# Patient Record
Sex: MALE | Race: ASIAN | URBAN - METROPOLITAN AREA
[De-identification: names, ages, dates, MRNs, and addresses within clinical notes are randomized per-mention and may not be internally consistent; named-entity substitution may affect disease eponyms.]

---

## 2024-06-24 ENCOUNTER — OFFICE (OUTPATIENT)
Dept: URBAN - METROPOLITAN AREA CLINIC 76 | Facility: CLINIC | Age: 44
End: 2024-06-24

## 2024-06-24 VITALS
DIASTOLIC BLOOD PRESSURE: 74 MMHG | HEART RATE: 65 BPM | HEIGHT: 67 IN | SYSTOLIC BLOOD PRESSURE: 120 MMHG | WEIGHT: 172 LBS

## 2024-06-24 DIAGNOSIS — K62.5 HEMORRHAGE OF ANUS AND RECTUM: ICD-10-CM

## 2024-06-24 DIAGNOSIS — R10.11 RIGHT UPPER QUADRANT PAIN: ICD-10-CM

## 2024-06-24 DIAGNOSIS — K76.0 FATTY (CHANGE OF) LIVER, NOT ELSEWHERE CLASSIFIED: ICD-10-CM

## 2024-06-24 DIAGNOSIS — K59.00 CONSTIPATION, UNSPECIFIED: ICD-10-CM

## 2024-06-24 DIAGNOSIS — L29.0 PRURITUS ANI: ICD-10-CM

## 2024-06-24 DIAGNOSIS — K64.8 OTHER HEMORRHOIDS: ICD-10-CM

## 2024-06-24 PROCEDURE — 99204 OFFICE O/P NEW MOD 45 MIN: CPT | Performed by: INTERNAL MEDICINE

## 2024-06-24 RX ORDER — PANTOPRAZOLE SODIUM 40 MG/1
TABLET, DELAYED RELEASE ORAL
Qty: 30 | Refills: 6 | Status: ACTIVE
Start: 2024-06-24

## 2025-02-25 ENCOUNTER — OFFICE VISIT (OUTPATIENT)
Dept: FAMILY MEDICINE CLINIC | Facility: CLINIC | Age: 45
End: 2025-02-25
Payer: COMMERCIAL

## 2025-02-25 VITALS
OXYGEN SATURATION: 98 % | HEIGHT: 67 IN | WEIGHT: 177.5 LBS | RESPIRATION RATE: 16 BRPM | TEMPERATURE: 98.5 F | BODY MASS INDEX: 27.86 KG/M2 | DIASTOLIC BLOOD PRESSURE: 94 MMHG | SYSTOLIC BLOOD PRESSURE: 150 MMHG | HEART RATE: 82 BPM

## 2025-02-25 DIAGNOSIS — K76.0 FATTY LIVER: ICD-10-CM

## 2025-02-25 DIAGNOSIS — Z00.00 ANNUAL PHYSICAL EXAM: ICD-10-CM

## 2025-02-25 DIAGNOSIS — Z00.00 ENCOUNTER FOR MEDICAL EXAMINATION TO ESTABLISH CARE: Primary | ICD-10-CM

## 2025-02-25 DIAGNOSIS — K92.1: ICD-10-CM

## 2025-02-25 DIAGNOSIS — R79.89 ELEVATED LFTS: ICD-10-CM

## 2025-02-25 DIAGNOSIS — K21.9 GASTROESOPHAGEAL REFLUX DISEASE, UNSPECIFIED WHETHER ESOPHAGITIS PRESENT: ICD-10-CM

## 2025-02-25 DIAGNOSIS — K64.4 EXTERNAL HEMORRHOIDS: ICD-10-CM

## 2025-02-25 PROCEDURE — 99204 OFFICE O/P NEW MOD 45 MIN: CPT | Performed by: STUDENT IN AN ORGANIZED HEALTH CARE EDUCATION/TRAINING PROGRAM

## 2025-02-26 LAB
ALBUMIN SERPL-MCNC: 4.8 G/DL (ref 4.1–5.1)
ALP SERPL-CCNC: 87 IU/L (ref 44–121)
ALT SERPL-CCNC: 25 IU/L (ref 0–44)
AST SERPL-CCNC: 21 IU/L (ref 0–40)
BILIRUB SERPL-MCNC: 0.7 MG/DL (ref 0–1.2)
BUN SERPL-MCNC: 14 MG/DL (ref 6–24)
BUN/CREAT SERPL: 17 (ref 9–20)
CALCIUM SERPL-MCNC: 9.9 MG/DL (ref 8.7–10.2)
CHLORIDE SERPL-SCNC: 101 MMOL/L (ref 96–106)
CO2 SERPL-SCNC: 23 MMOL/L (ref 20–29)
CREAT SERPL-MCNC: 0.84 MG/DL (ref 0.76–1.27)
EGFRCR SERPLBLD CKD-EPI 2021: 110 ML/MIN/1.73
ERYTHROCYTE [DISTWIDTH] IN BLOOD BY AUTOMATED COUNT: 12.2 % (ref 11.6–15.4)
GLOBULIN SER CALC-MCNC: 3.2 G/DL (ref 1.5–4.5)
GLUCOSE SERPL-MCNC: 97 MG/DL (ref 70–99)
HCT VFR BLD AUTO: 50.1 % (ref 37.5–51)
HGB BLD-MCNC: 16.5 G/DL (ref 13–17.7)
MCH RBC QN AUTO: 29.7 PG (ref 26.6–33)
MCHC RBC AUTO-ENTMCNC: 32.9 G/DL (ref 31.5–35.7)
MCV RBC AUTO: 90 FL (ref 79–97)
PLATELET # BLD AUTO: 279 X10E3/UL (ref 150–450)
POTASSIUM SERPL-SCNC: 4.1 MMOL/L (ref 3.5–5.2)
PROT SERPL-MCNC: 8 G/DL (ref 6–8.5)
RBC # BLD AUTO: 5.55 X10E6/UL (ref 4.14–5.8)
SODIUM SERPL-SCNC: 139 MMOL/L (ref 134–144)
WBC # BLD AUTO: 7.2 X10E3/UL (ref 3.4–10.8)

## 2025-02-26 NOTE — PROGRESS NOTES
Chief Complaint  Establish Care    Subjective    History of Present Illness  The patient is a 45-year-old gentleman here for the first time to establish care as a new patient.    He has been diagnosed with fatty liver disease, identified through an abdominal ultrasound conducted during a routine checkup. His last consultation was with Dr. Mendez, a gastroenterologist, six months ago, during which his blood pressure was within normal limits. He has elevated liver function tests.    In 2013, he underwent a colonoscopy that revealed the presence of external hemorrhoids. He has recently noticed blood in his stool, which he attributes to his long-standing hemorrhoids. He does not report any constipation or urinary issues. He has been referred for a colonoscopy by another physician but has not yet undergone the procedure due to financial constraints.    He also reports occasional episodes of acid reflux but has not had an endoscopy for this issue.    He is a former smoker and currently abstains from smoking. He does not consume alcohol daily but admits to drinking on weekends.    He is experiencing sleep disturbances, which he believes are due to work-related stress. His work schedule requires him to communicate with his team in [facility] during the night, resulting in a sleep pattern that typically begins after midnight and ends around 7:00 AM. Despite this, he often does not fall asleep until approximately 2:00 AM, leading to a total sleep duration of about 5 hours. This sleep pattern has been consistent for an extended period.    SOCIAL HISTORY  The patient is a former smoker but currently does not smoke. He does not drink alcohol regularly, only on weekends.    FAMILY HISTORY  The patient has no family history of colon cancer.    ALLERGIES  The patient has no known allergies.       Zita Flynn Nato presents to [facility]  History of Present Illness    Objective   Vital Signs:  /94  "(BP Location: Left arm, Patient Position: Sitting, Cuff Size: Adult)   Pulse 82   Temp 98.5 °F (36.9 °C) (Oral)   Resp 16   Ht 170.2 cm (67\")   Wt 80.5 kg (177 lb 8 oz)   SpO2 98%   BMI 27.80 kg/m²   Estimated body mass index is 27.8 kg/m² as calculated from the following:    Height as of this encounter: 170.2 cm (67\").    Weight as of this encounter: 80.5 kg (177 lb 8 oz).    BMI is >= 25 and <30. (Overweight) The following options were offered after discussion;: exercise counseling/recommendations and nutrition counseling/recommendations      Physical Exam  HENT:      Head: Normocephalic and atraumatic.      Mouth/Throat:      Mouth: Mucous membranes are moist.      Pharynx: Oropharynx is clear.   Eyes:      Extraocular Movements: Extraocular movements intact.      Conjunctiva/sclera: Conjunctivae normal.      Pupils: Pupils are equal, round, and reactive to light.   Cardiovascular:      Rate and Rhythm: Normal rate and regular rhythm.   Pulmonary:      Effort: Pulmonary effort is normal.      Breath sounds: Normal breath sounds.   Abdominal:      General: Bowel sounds are normal.      Palpations: Abdomen is soft.   Musculoskeletal:         General: Normal range of motion.      Cervical back: Neck supple.   Skin:     General: Skin is warm.      Capillary Refill: Capillary refill takes less than 2 seconds.   Neurological:      General: No focal deficit present.      Mental Status: He is alert and oriented to person, place, and time. Mental status is at baseline.   Psychiatric:         Mood and Affect: Mood normal.        Result Review :  The following data was reviewed by: Lester Mixon MD on 02/25/2025:  CMP          2/25/2025    15:39   CMP   Glucose 97    BUN 14    Creatinine 0.84    EGFR 110    Sodium 139    Potassium 4.1    Chloride 101    Calcium 9.9    Total Protein 8.0    Albumin 4.8    Globulin 3.2    Total Bilirubin 0.7    Alkaline Phosphatase 87    AST (SGOT) 21    ALT (SGPT) 25  "   BUN/Creatinine Ratio 17      CBC          2/25/2025    15:39   CBC   WBC 7.2    RBC 5.55    Hemoglobin 16.5    Hematocrit 50.1    MCV 90    MCH 29.7    MCHC 32.9    RDW 12.2    Platelets 279                    Assessment and Plan   Diagnoses and all orders for this visit:    1. Encounter for medical examination to establish care (Primary)    2. Fatty liver  -     CBC No Differential  -     Comprehensive metabolic panel    3. External hemorrhoids    4. Gastroesophageal reflux disease, unspecified whether esophagitis present    5. Annual physical exam  -     CBC Auto Differential; Future  -     Comprehensive Metabolic Panel; Future  -     Lipid Panel With / Chol / HDL Ratio; Future  -     TSH; Future  -     Urinalysis With Microscopic - Urine, Clean Catch; Future    6. Elevated LFTs  -     Comprehensive metabolic panel    7. Blood in stool, joel  -     CBC No Differential        Assessment & Plan  1. Hypertension.  His blood pressure was recorded at 150/94, which is elevated. He is not currently on any antihypertensive medications. Lifestyle modifications were recommended, including regular physical activity such as walking, weight loss, reduced salt intake, and decreased consumption of caffeine and alcohol. A re-evaluation of his blood pressure will be conducted in 6 months. If it remains elevated, pharmacological intervention will be considered.    2. Fatty Liver.  He has a history of fatty liver diagnosed via ultrasound. A comprehensive metabolic panel (CMP) and liver function tests (LFTs) will be ordered today to monitor his condition.    3. External Hemorrhoids.  He has a history of external hemorrhoids and has recently noticed blood in his stool. He was advised to use over-the-counter Preparation H cream for bleeding hemorrhoids. A complete blood count (CBC) will be ordered today to assess for anemia due to potential blood loss.    4. Gastroesophageal Reflux Disease (GERD).  He reported occasional acid  reflux. Lifestyle modifications, including avoiding late meals and reducing caffeine and alcohol intake, were recommended.    5. Insomnia.  He reported difficulty sleeping, likely due to work-related stress and irregular sleep patterns. He was advised to improve sleep hygiene by establishing a regular sleep schedule and creating a conducive sleep environment.    6. Health Maintenance.  A colonoscopy will be scheduled for next year when he turns 46, as part of routine screening for colon cancer. A comprehensive metabolic panel (CMP), cholesterol panel, thyroid function tests, and urinalysis will be ordered during his physical visit in 6 months.    Follow-up  The patient will follow up in 6 months for a physical examination.    PROCEDURE  Colonoscopy in 2013 revealed the presence of external hemorrhoids.            Follow Up   No follow-ups on file.  Patient was given instructions and counseling regarding his condition or for health maintenance advice. Please see specific information pulled into the AVS if appropriate.     Patient or patient representative verbalized consent for the use of Ambient Listening during the visit with  Lester Mixon MD for chart documentation. 2/26/2025  05:50 EST